# Patient Record
Sex: FEMALE | Race: OTHER | Employment: OTHER | URBAN - METROPOLITAN AREA
[De-identification: names, ages, dates, MRNs, and addresses within clinical notes are randomized per-mention and may not be internally consistent; named-entity substitution may affect disease eponyms.]

---

## 2022-07-04 RX ORDER — GLIMEPIRIDE 4 MG/1
TABLET ORAL
COMMUNITY
Start: 2021-06-28 | End: 2022-10-13 | Stop reason: SDUPTHER

## 2022-12-05 NOTE — PATIENT DISCUSSION
Advised patient to continue taking Areds2 vitamins. Explained to patient that OS vision is not able to improve due to the folds in macula seen on OCT and exam today and that glasses would not improve vision. Patient reports understanding.

## 2023-04-07 ENCOUNTER — EMERGENCY VISIT (OUTPATIENT)
Facility: LOCATION | Age: 43
End: 2023-04-07

## 2023-04-07 DIAGNOSIS — H53.8: ICD-10-CM

## 2023-04-07 PROCEDURE — 92250 FUNDUS PHOTOGRAPHY W/I&R: CPT

## 2023-04-07 PROCEDURE — 92014 COMPRE OPH EXAM EST PT 1/>: CPT

## 2023-04-07 ASSESSMENT — VISUAL ACUITY
OU_SC: 20/30-1
OS_SC: 20/40+2
OD_SC: 20/30-1

## 2023-04-12 ENCOUNTER — PREPPED CHART (OUTPATIENT)
Facility: LOCATION | Age: 43
End: 2023-04-12

## 2023-07-12 ENCOUNTER — ESTABLISHED PATIENT (OUTPATIENT)
Facility: LOCATION | Age: 43
End: 2023-07-12

## 2023-07-12 DIAGNOSIS — H53.8: ICD-10-CM

## 2023-07-12 DIAGNOSIS — H47.233: ICD-10-CM

## 2023-07-12 DIAGNOSIS — H52.13: ICD-10-CM

## 2023-07-12 PROCEDURE — 92015 DETERMINE REFRACTIVE STATE: CPT

## 2023-07-12 PROCEDURE — 92250 FUNDUS PHOTOGRAPHY W/I&R: CPT

## 2023-07-12 PROCEDURE — 92014 COMPRE OPH EXAM EST PT 1/>: CPT

## 2023-07-12 ASSESSMENT — KERATOMETRY
OD_K1POWER_DIOPTERS: 45.00
OS_AXISANGLE_DEGREES: 3
OD_AXISANGLE2_DEGREES: 60
OS_K2POWER_DIOPTERS: 45.50
OS_AXISANGLE2_DEGREES: 93
OS_K1POWER_DIOPTERS: 45.25
OD_K2POWER_DIOPTERS: 45.25
OD_AXISANGLE_DEGREES: 150

## 2023-07-12 ASSESSMENT — TONOMETRY
OS_IOP_MMHG: 21
OD_IOP_MMHG: 18

## 2023-07-12 ASSESSMENT — VISUAL ACUITY
OD_SC: 20/40+2
OS_SC: 20/40+1
OU_SC: 20/30-1

## 2024-12-30 ENCOUNTER — COMPREHENSIVE EXAM (OUTPATIENT)
Age: 44
End: 2024-12-30

## 2024-12-30 DIAGNOSIS — H47.233: ICD-10-CM

## 2024-12-30 DIAGNOSIS — H52.13: ICD-10-CM

## 2024-12-30 PROCEDURE — 92250 FUNDUS PHOTOGRAPHY W/I&R: CPT

## 2024-12-30 PROCEDURE — 92014 COMPRE OPH EXAM EST PT 1/>: CPT

## 2024-12-30 PROCEDURE — 92015 DETERMINE REFRACTIVE STATE: CPT

## 2025-06-16 ENCOUNTER — COMPREHENSIVE EXAM (OUTPATIENT)
Age: 45
End: 2025-06-16

## 2025-06-16 DIAGNOSIS — E11.9: ICD-10-CM

## 2025-06-16 DIAGNOSIS — H52.13: ICD-10-CM

## 2025-06-16 PROCEDURE — 92015 DETERMINE REFRACTIVE STATE: CPT

## 2025-06-16 PROCEDURE — 99211NC NO CHARGE VISIT
